# Patient Record
Sex: FEMALE | Race: WHITE | NOT HISPANIC OR LATINO | URBAN - METROPOLITAN AREA
[De-identification: names, ages, dates, MRNs, and addresses within clinical notes are randomized per-mention and may not be internally consistent; named-entity substitution may affect disease eponyms.]

---

## 2021-05-06 ENCOUNTER — HOSPITAL ENCOUNTER (OUTPATIENT)
Dept: MEDSURG UNIT | Facility: CLINIC | Age: 31
Discharge: HOME | End: 2021-05-06
Attending: OBSTETRICS & GYNECOLOGY | Admitting: OBSTETRICS & GYNECOLOGY

## 2021-05-06 RX ORDER — ACETAMINOPHEN 325 MG/1
650 TABLET ORAL EVERY 6 HOURS PRN
Status: SHIPPED | COMMUNITY
Start: 2021-05-06

## 2021-05-06 RX ORDER — MAGNESIUM OXIDE 400 MG/1
400 TABLET ORAL DAILY
Status: SHIPPED | COMMUNITY
Start: 2021-05-06

## 2021-05-06 ASSESSMENT — MIFFLIN-ST. JEOR: SCORE: 1364.99

## 2021-05-27 VITALS — HEIGHT: 61 IN | WEIGHT: 156 LBS | BODY MASS INDEX: 29.45 KG/M2

## 2021-06-17 NOTE — PROGRESS NOTES
, EDC 21 presents to [facility] for external cephalic version with Dr. Card. Patient has received prenatal care through [facility]. EFM applied. Reactive category 1 tracing obtained. Contractions noted every 4-8 minutes. Palpate mild. Patient reports they are mild. Terbutaline 1.25 mg sub Q given at 1327.  here at 1351. Consented patient for external cephalic version. Fetal position confirmed breech with ultrasound. Version successful. Fetal position now confirmed vertex with ultrasound. Will monitor FHR for 30 minutes. Plan dicontinue to home after monitoring.

## 2021-06-17 NOTE — PROGRESS NOTES
, EDC [birth year] presents to [facility] triage accompanied by spouse for external cephalic version with Dr. Card. Patient has received prenatal care with [facility] and intends to deliver there. EFM applied. Reactive category 1 tracing obtained. Contractions noted every 4-8 minutes. Palpate mild. Patient reports they are mild. Terbutaline 1.25 mg SubQ given at 1327. Dr. Card here at 1552. Consented patient for external cephalic version. Ultrasound prior to version confirms breech presentation. Ultrasound after version confirms vertex presentation. Patient monitored for 30 minutes after version. Reactive category 1 tracing obtained. Moild contractions noted every 2-4 minutes. Patient discharged to home at this time.

## 2021-06-26 NOTE — OP NOTE
Procedure Date: 2021    PREOPERATIVE DIAGNOSIS:  Breech presentation.    POSTOPERATIVE DIAGNOSES:  Successful external cephalic version.    PROCEDURES PERFORMED:  External cephalic version and a bedside ultrasound.    SURGEON:  Usama Card MD    COMPLICATIONS:  None.    INDICATIONS FOR PROCEDURE:  This is a 30-year-old  3, para 2 female with an EDC of 2021 who presented to the women's maternity care unit for external cephalic version.  Tracing was category 1.  Contractions were every 4-8 minutes, so the   patient was given terbutaline, which was successful in terms of decreasing contractions.    Ultrasound confirmed a mikel breech presentation with adequate fluid volume present.  After informed consent, ultrasound was used to monitor the heart.  My hand was placed just above the pelvis and with clockwise traction the infant was brought to the   vertex presentation atraumatically.  Postop assessment was normal.  The patient was monitored for 30 minutes after the external cephalic version and was again category 1.    ASSESSMENT:  Successful external cephalic version.     PLAN:  Followup with primary care.    Usama Card MD        D: 2021   T: 2021   MT: [id number]    Name:     GILLIAN ARTHUR  MRN:      [id number]        Account:        [id number]             Procedure Date: 2021     Document: [id number]